# Patient Record
Sex: FEMALE | Race: WHITE | Employment: FULL TIME | URBAN - METROPOLITAN AREA
[De-identification: names, ages, dates, MRNs, and addresses within clinical notes are randomized per-mention and may not be internally consistent; named-entity substitution may affect disease eponyms.]

---

## 2024-01-10 ENCOUNTER — OFFICE VISIT (OUTPATIENT)
Dept: OPHTHALMOLOGY | Facility: CLINIC | Age: 24
End: 2024-01-10
Payer: COMMERCIAL

## 2024-01-10 DIAGNOSIS — H52.13 MYOPIC ASTIGMATISM OF BOTH EYES: Primary | ICD-10-CM

## 2024-01-10 DIAGNOSIS — H52.203 MYOPIC ASTIGMATISM OF BOTH EYES: Primary | ICD-10-CM

## 2024-01-10 PROCEDURE — 92310 CONTACT LENS FITTING OU: CPT | Performed by: OPTOMETRIST

## 2024-01-10 PROCEDURE — 92015 DETERMINE REFRACTIVE STATE: CPT | Performed by: OPTOMETRIST

## 2024-01-10 PROCEDURE — 92014 COMPRE OPH EXAM EST PT 1/>: CPT | Performed by: OPTOMETRIST

## 2024-01-10 RX ORDER — FAMOTIDINE 20 MG/1
20 TABLET, FILM COATED ORAL DAILY
COMMUNITY
Start: 2023-07-25

## 2024-01-10 RX ORDER — RIZATRIPTAN BENZOATE 10 MG/1
TABLET ORAL
COMMUNITY
Start: 2023-08-18

## 2024-01-10 RX ORDER — NORGESTIMATE AND ETHINYL ESTRADIOL 0.25-0.035
KIT ORAL
COMMUNITY
Start: 2023-11-11

## 2024-01-10 ASSESSMENT — VISUAL ACUITY
CORRECTION_TYPE: CONTACTS
METHOD: SNELLEN - LINEAR
VA_OR_OS_CURRENT_RX: 20/20
VA_OR_OD_CURRENT_RX: 20/20
OS_CC: 20/20
OD_CC: 20/20

## 2024-01-10 ASSESSMENT — ENCOUNTER SYMPTOMS
RESPIRATORY NEGATIVE: 0
ENDOCRINE NEGATIVE: 0
CONSTITUTIONAL NEGATIVE: 0
EYES NEGATIVE: 0
NEUROLOGICAL NEGATIVE: 1
CARDIOVASCULAR NEGATIVE: 0
PSYCHIATRIC NEGATIVE: 0
MUSCULOSKELETAL NEGATIVE: 0
ALLERGIC/IMMUNOLOGIC NEGATIVE: 1
HEMATOLOGIC/LYMPHATIC NEGATIVE: 0
GASTROINTESTINAL NEGATIVE: 0

## 2024-01-10 ASSESSMENT — REFRACTION_CURRENTRX
OS_DIAMETER: 14.4
OD_AXIS: 160
OD_DIAMETER: 14.4
OD_BRAND: DAILIES AQUACOMFORT PLUS TORIC
OS_SPHERE: -5.50
OD_SPHERE: -5.50
OD_CYLINDER: -0.75
OD_BASECURVE: 8.8
OS_BRAND: DAILIES AQUACOMFORT PLUS TORIC
OS_BASECURVE: 8.8
OS_CYLINDER: -0.75
OS_AXIS: 010

## 2024-01-10 ASSESSMENT — TONOMETRY
OS_IOP_MMHG: 14
IOP_METHOD: GOLDMANN APPLANATION
OD_IOP_MMHG: 14

## 2024-01-10 ASSESSMENT — REFRACTION_MANIFEST
OD_CYLINDER: -1.00
OD_SPHERE: -5.00
OS_SPHERE: -5.50
OS_CYLINDER: -1.25
OS_AXIS: 010
OD_AXIS: 170

## 2024-01-10 ASSESSMENT — CONF VISUAL FIELD
OD_NORMAL: 1
OD_SUPERIOR_TEMPORAL_RESTRICTION: 0
OS_SUPERIOR_TEMPORAL_RESTRICTION: 0
METHOD: COUNTING FINGERS
OS_SUPERIOR_NASAL_RESTRICTION: 0
OD_SUPERIOR_NASAL_RESTRICTION: 0
OD_INFERIOR_TEMPORAL_RESTRICTION: 0
OS_INFERIOR_TEMPORAL_RESTRICTION: 0
OS_INFERIOR_NASAL_RESTRICTION: 0
OS_NORMAL: 1
OD_INFERIOR_NASAL_RESTRICTION: 0

## 2024-01-10 ASSESSMENT — REFRACTION_WEARINGRX
OD_CYLINDER: -1.00
OS_CYLINDER: -1.25
OD_AXIS: 160
OS_SPHERE: -6.00
OS_AXIS: 010
OD_SPHERE: -6.00

## 2024-01-10 ASSESSMENT — CUP TO DISC RATIO
OS_RATIO: .3
OD_RATIO: .3

## 2024-01-10 ASSESSMENT — SLIT LAMP EXAM - LIDS
COMMENTS: GOOD POSITION
COMMENTS: GOOD POSITION

## 2024-01-10 ASSESSMENT — EXTERNAL EXAM - LEFT EYE: OS_EXAM: NORMAL

## 2024-01-10 ASSESSMENT — EXTERNAL EXAM - RIGHT EYE: OD_EXAM: NORMAL

## 2024-01-10 NOTE — PROGRESS NOTES
A spectacle prescription was dispensed to be used as needed. A contact lens prescription was dispensed at the patient's request. Reduced spectacle Rx slightly, did not change CL Rx. Ocular health normal. The patient was asked to return to our clinic in one year or sooner if ocular or vision changes occur.

## 2025-01-17 ENCOUNTER — APPOINTMENT (OUTPATIENT)
Dept: OPHTHALMOLOGY | Facility: CLINIC | Age: 25
End: 2025-01-17
Payer: COMMERCIAL

## 2025-01-17 DIAGNOSIS — H52.13 MYOPIC ASTIGMATISM OF BOTH EYES: Primary | ICD-10-CM

## 2025-01-17 DIAGNOSIS — H52.203 MYOPIC ASTIGMATISM OF BOTH EYES: Primary | ICD-10-CM

## 2025-01-17 ASSESSMENT — EXTERNAL EXAM - RIGHT EYE: OD_EXAM: NORMAL

## 2025-01-17 ASSESSMENT — REFRACTION_CURRENTRX
OD_BRAND: DAILIES AQUACOMFORT PLUS TORIC
OS_DIAMETER: 14.4
OS_SPHERE: -5.50
OS_BRAND: DAILIES AQUACOMFORT PLUS TORIC
OD_CYLINDER: -0.75
OD_BASECURVE: 8.8
OD_SPHERE: -5.50
OS_AXIS: 010
OS_SPHERE: -5.50
OD_SPHERE: -5.50
OD_AXIS: 160
OS_CYLINDER: -0.75
OD_AXIS: 160
OD_DIAMETER: 14.4
OS_AXIS: 010
OD_BRAND: DAILIES AQUACOMFORT PLUS TORIC
OS_CYLINDER: -1.25
OD_CYLINDER: -0.75
OS_DIAMETER: 14.4
OD_BASECURVE: 8.8
OS_BASECURVE: 8.8
OS_BRAND: DAILIES AQUACOMFORT PLUS TORIC
OD_DIAMETER: 14.4
OS_BASECURVE: 8.8

## 2025-01-17 ASSESSMENT — VISUAL ACUITY
VA_OR_OD_CURRENT_RX: 20/20
VA_OR_OS_CURRENT_RX: 20/20
OD_CC: 20/25
VA_OR_OD_CURRENT_RX: 20/20
OS_CC: 20/25
CORRECTION_TYPE: CONTACTS
VA_OR_OS_CURRENT_RX: 20/20
METHOD: SNELLEN - LINEAR

## 2025-01-17 ASSESSMENT — EXTERNAL EXAM - LEFT EYE: OS_EXAM: NORMAL

## 2025-01-17 ASSESSMENT — ENCOUNTER SYMPTOMS
ALLERGIC/IMMUNOLOGIC NEGATIVE: 0
PSYCHIATRIC NEGATIVE: 0
GASTROINTESTINAL NEGATIVE: 0
ENDOCRINE NEGATIVE: 0
EYES NEGATIVE: 1
CONSTITUTIONAL NEGATIVE: 0
HEMATOLOGIC/LYMPHATIC NEGATIVE: 0
RESPIRATORY NEGATIVE: 0
MUSCULOSKELETAL NEGATIVE: 0
CARDIOVASCULAR NEGATIVE: 0
NEUROLOGICAL NEGATIVE: 0

## 2025-01-17 ASSESSMENT — CONF VISUAL FIELD
OS_INFERIOR_NASAL_RESTRICTION: 0
OS_NORMAL: 1
OD_SUPERIOR_TEMPORAL_RESTRICTION: 0
OD_INFERIOR_TEMPORAL_RESTRICTION: 0
OD_INFERIOR_NASAL_RESTRICTION: 0
OD_SUPERIOR_NASAL_RESTRICTION: 0
OD_NORMAL: 1
OS_INFERIOR_TEMPORAL_RESTRICTION: 0
OS_SUPERIOR_TEMPORAL_RESTRICTION: 0
OS_SUPERIOR_NASAL_RESTRICTION: 0

## 2025-01-17 ASSESSMENT — SLIT LAMP EXAM - LIDS
COMMENTS: GOOD POSITION
COMMENTS: GOOD POSITION

## 2025-01-17 ASSESSMENT — REFRACTION_WEARINGRX
OD_AXIS: 170
OS_CYLINDER: -1.25
OD_SPHERE: -5.00
OS_AXIS: 010
OD_CYLINDER: -1.00
OS_SPHERE: -5.50

## 2025-01-17 ASSESSMENT — REFRACTION_MANIFEST
OD_SPHERE: -5.50
OD_AXIS: 170
OS_CYLINDER: -1.75
OD_CYLINDER: -1.25
OS_SPHERE: -5.75
OS_AXIS: 010

## 2025-01-17 ASSESSMENT — TONOMETRY
OD_IOP_MMHG: 15
OS_IOP_MMHG: 15
IOP_METHOD: GOLDMANN APPLANATION

## 2025-01-17 ASSESSMENT — CUP TO DISC RATIO
OS_RATIO: .3
OD_RATIO: .3

## 2025-01-31 ENCOUNTER — TELEPHONE (OUTPATIENT)
Dept: OPHTHALMOLOGY | Facility: CLINIC | Age: 25
End: 2025-01-31

## 2025-01-31 NOTE — TELEPHONE ENCOUNTER
Called patient several times to schedule an appt. Patient have a cl stuck in her eye. Couldn't lvm.

## 2025-02-03 ENCOUNTER — TELEPHONE (OUTPATIENT)
Dept: OPHTHALMOLOGY | Age: 25
End: 2025-02-03
Payer: COMMERCIAL

## 2025-02-03 NOTE — TELEPHONE ENCOUNTER
Pt called triage line stating she thinks she got a contact lens stuck in unspecified eye. LVM at 2:44pm.

## 2026-01-30 ENCOUNTER — APPOINTMENT (OUTPATIENT)
Dept: OPHTHALMOLOGY | Age: 26
End: 2026-01-30
Payer: COMMERCIAL